# Patient Record
(demographics unavailable — no encounter records)

---

## 2025-01-03 NOTE — HISTORY OF PRESENT ILLNESS
[FreeTextEntry1] : 44 y.o. female with no significant PMH. Here for evaluation of shortness of breath on exertion.  [id number].

## 2025-01-03 NOTE — PHYSICAL EXAM

## 2025-01-03 NOTE — DISCUSSION/SUMMARY
[FreeTextEntry1] : DIET DISCUSSED IN DETAIL ECHO TO ASSESS FOR STRUCTURAL HEART DISEASE STRESS TEST FOR RISK STRATIFICATION FOLLOWUP WITH PMD FOLLOWUP POST TEST    [EKG obtained to assist in diagnosis and management of assessed problem(s)] : EKG obtained to assist in diagnosis and management of assessed problem(s)

## 2025-03-19 NOTE — HISTORY OF PRESENT ILLNESS
[FreeTextEntry1] : 44 y.o. female with no significant PMH. Here for evaluation of shortness of breath on exertion.  Leon [id number]. Normal LV function on Echo and Stress Test with no ischemia

## 2025-03-19 NOTE — RESULTS/DATA
[TextEntry] : EKG NML  ECHO 1. Left ventricular cavity is normal in size. Left ventricular wall thickness is normal. Left ventricular systolic function is normal with an ejection fraction visually estimated at 60 to 65 %. 2. Trace mitral regurgitation. 3. Trace pulmonic regurgitation. 4. Trace tricuspid regurgitation.  STRESS TEST   1. The ECG is negative for ischemia. Stress Test Results: Protocol: Standard Lewis METS Achieved: 10.9 Stage Reached: 3 Exercise Duration: 9 min and 24 sec. Heart Rate: Resting 75 bpm, Stress peak 133 bpm (76 % max predicted) HR Response: Normal. Blood Pressure: Resting 93/61 mmHg, Stress max 124/62 mmHg BP Response: Normal. Exercise Capacity: Good. Pretest Chest Pain: None. Stress Test Chest Pain: no chest pain during exercise Symptoms During Stress: Fatigue. Reason for Termination: Fatigue and patient request.

## 2025-03-19 NOTE — PHYSICAL EXAM

## 2025-03-19 NOTE — DISCUSSION/SUMMARY
[FreeTextEntry1] : DIET DISCUSSED IN DETAIL FOLLOWUP WITH PMD FOR WORKUP OF NON-CARDIAC ETIOLOGY OF SYMPTOMS. [EKG obtained to assist in diagnosis and management of assessed problem(s)] : EKG obtained to assist in diagnosis and management of assessed problem(s)